# Patient Record
Sex: MALE | Race: WHITE | NOT HISPANIC OR LATINO | URBAN - METROPOLITAN AREA
[De-identification: names, ages, dates, MRNs, and addresses within clinical notes are randomized per-mention and may not be internally consistent; named-entity substitution may affect disease eponyms.]

---

## 2023-11-09 ENCOUNTER — EMERGENCY (EMERGENCY)
Facility: HOSPITAL | Age: 23
LOS: 1 days | Discharge: HOME | End: 2023-11-09
Attending: EMERGENCY MEDICINE
Payer: SELF-PAY

## 2023-11-09 VITALS
TEMPERATURE: 97 F | DIASTOLIC BLOOD PRESSURE: 77 MMHG | RESPIRATION RATE: 15 BRPM | OXYGEN SATURATION: 100 % | HEART RATE: 76 BPM | SYSTOLIC BLOOD PRESSURE: 129 MMHG

## 2023-11-09 VITALS
HEART RATE: 81 BPM | HEIGHT: 72 IN | TEMPERATURE: 98 F | DIASTOLIC BLOOD PRESSURE: 89 MMHG | OXYGEN SATURATION: 97 % | RESPIRATION RATE: 18 BRPM | SYSTOLIC BLOOD PRESSURE: 149 MMHG | WEIGHT: 177.91 LBS

## 2023-11-09 PROCEDURE — 99282 EMERGENCY DEPT VISIT SF MDM: CPT

## 2023-11-09 PROCEDURE — 99283 EMERGENCY DEPT VISIT LOW MDM: CPT
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
## 2023-11-09 NOTE — ED ADULT NURSE NOTE - NSFALLUNIVINTERV_ED_ALL_ED
Bed/Stretcher in lowest position, wheels locked, appropriate side rails in place/Call bell, personal items and telephone in reach/Instruct patient to call for assistance before getting out of bed/chair/stretcher/Non-slip footwear applied when patient is off stretcher/[place] to call system/Physically safe environment - no spills, clutter or unnecessary equipment/Purposeful proactive rounding/Room/bathroom lighting operational, light cord in reach
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
## 2023-11-09 NOTE — ED PROVIDER NOTE - NS ED MD DISPO DISCHARGE CCDA
Health Maintenance Due   Topic Date Due   • Shingles Vaccine (1 of 2) Never done   • Hepatitis C Screening  Never done   • Influenza Vaccine (1) 09/01/2021       Patient is due for topics listed above, he wishes to proceed with Immunization(s) Influenza, but is not proceeding with Immunization(s) Shingles and Hepatitis C Screening at this time. The following has occurred: Education provided for Immunization(s) Shingles and Hepatitis C Screening.         Patient/Caregiver provided printed discharge information.

## 2023-11-09 NOTE — ED PROVIDER NOTE - PATIENT PORTAL LINK FT
You can access the FollowMyHealth Patient Portal offered by [facility] by registering at the following website: [email or web address]. By joining [facility]’s FollowMyHealth portal, you will also be able to view your health information using other applications (apps) compatible with our system.
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
